# Patient Record
Sex: FEMALE | Race: OTHER | Employment: UNEMPLOYED | URBAN - METROPOLITAN AREA
[De-identification: names, ages, dates, MRNs, and addresses within clinical notes are randomized per-mention and may not be internally consistent; named-entity substitution may affect disease eponyms.]

---

## 2024-01-27 ENCOUNTER — HOSPITAL ENCOUNTER (EMERGENCY)
Age: 44
Discharge: HOME | End: 2024-01-27
Payer: MEDICAID

## 2024-01-27 VITALS
WEIGHT: 160 LBS | TEMPERATURE: 98.5 F | HEART RATE: 71 BPM | RESPIRATION RATE: 16 BRPM | OXYGEN SATURATION: 100 % | SYSTOLIC BLOOD PRESSURE: 112 MMHG | DIASTOLIC BLOOD PRESSURE: 68 MMHG

## 2024-01-27 DIAGNOSIS — R21 RASH AND OTHER NONSPECIFIC SKIN ERUPTION: Primary | ICD-10-CM

## 2024-01-27 PROCEDURE — 99283 EMERGENCY DEPT VISIT LOW MDM: CPT

## 2024-01-27 RX ORDER — BENZOCAINE/MENTHOL 6 MG-10 MG
LOZENGE MUCOUS MEMBRANE
Qty: 1.5 G | Refills: 0 | Status: SHIPPED | OUTPATIENT
Start: 2024-01-27

## 2024-01-27 ASSESSMENT — ENCOUNTER SYMPTOMS
SHORTNESS OF BREATH: 0
ABDOMINAL PAIN: 0
TROUBLE SWALLOWING: 0

## 2024-01-27 NOTE — ED PROVIDER NOTES
cardiologist.  Please see their note for interpretation of EKG.    RADIOLOGY: Non-plain film images such as CT, Ultrasound and MRI are read by the radiologist. INiya APRN - CNP have directly visualized the radiologic plain film image(s) with the below findings read by radiologist and agree with interpretation:  No orders to display       Chart review shows recent radiograph(s):  No results found.    PROCEDURES   Unless otherwise noted below, none     Procedures    CRITICAL CARE TIME (.cctime)       CC/HPI, SUMMARY, DDx, ED COURSE, AND REASSESSMENT     Vitals:    01/27/24 1230   BP: 110/71   Pulse: 73   Resp: 16   Temp: 98.5 °F (36.9 °C)   TempSrc: Oral   SpO2: 100%   Weight: 72.6 kg (160 lb)       Chief Complaint   Patient presents with    Rash     States she has a rash on neck that comes and goes, been going on for over 6 months.        History from : Patient    Limitations to history : Language patient is none English speaking [place] Cre[place] therefore per professional interpretation services were provided for today's visit.        Chronic conditions affecting care: Pt  has no past medical history on file.    Sepsis Consideration:   Is this patient to be included in the SEP-1 Core Measure due to severe sepsis or septic shock?   No   Exclusion criteria - the patient is NOT to be included for SEP-1 Core Measure due to:  2+ SIRS criteria are not met    Patient seen and examined.  Work-up initiated secondary to presentation, physical exam findings, vital signs and medical chart review. Emergent etiologies considered.     Marcela Weldon is an alert and oriented x4, 43 y.o. female. Pt has easy nonlabored respirations.  Vital signs within acceptable parameters.  Patient is afebrile and in no acute distress. Pt hemodynamically stable and neurovascularly intact.    Patient was treated the following medications:  Medications - No data to display      Given HPI, ROS and PE as noted above, this is a 42 yo female  resources    Disposition Considerations (tests considered but not done, Shared Decision Making, Pt Expectation of Test or Tx.):     Appropriate for outpatient management. Patient discharge in  stable condition.      FINAL IMPRESSION      1. Rash and other nonspecific skin eruption          DISPOSITION/PLAN   DISPOSITION Decision To Discharge 01/27/2024 01:20:37 PM      PATIENT REFERRED TO:  Skye Middleton DO  [address] [place] [address] [address]  [place] [address]  [phone]    Schedule an appointment as soon as possible for a visit in 1 week  follow up    [facility]   [address] [place] [address]  [phone]  Go to   If symptoms worsen      DISCHARGE MEDICATIONS:  New Prescriptions    HYDROCORTISONE 1 % CREAM    Apply topically 2 times daily.          (Please note that portions of this note were completed with a voice recognition program.  Efforts were made to edit the dictations but occasionally words are mis-transcribed.)    HAYLEY Balderrama CNP (electronically signed)       Niya Gloria APRN - CNP  01/27/24 1963